# Patient Record
Sex: FEMALE | Race: AMERICAN INDIAN OR ALASKA NATIVE
[De-identification: names, ages, dates, MRNs, and addresses within clinical notes are randomized per-mention and may not be internally consistent; named-entity substitution may affect disease eponyms.]

---

## 2018-07-13 ENCOUNTER — HOSPITAL ENCOUNTER (EMERGENCY)
Dept: HOSPITAL 31 - C.ER | Age: 28
Discharge: HOME | End: 2018-07-13
Payer: MEDICAID

## 2018-07-13 VITALS
TEMPERATURE: 98.9 F | HEART RATE: 74 BPM | DIASTOLIC BLOOD PRESSURE: 66 MMHG | RESPIRATION RATE: 20 BRPM | OXYGEN SATURATION: 96 % | SYSTOLIC BLOOD PRESSURE: 95 MMHG

## 2018-07-13 DIAGNOSIS — Z3A.00: ICD-10-CM

## 2018-07-13 DIAGNOSIS — O20.0: Primary | ICD-10-CM

## 2018-07-13 LAB
BASOPHILS # BLD AUTO: 0 K/UL (ref 0–0.2)
BASOPHILS NFR BLD: 0.5 % (ref 0–2)
BILIRUB UR-MCNC: NEGATIVE MG/DL
EOSINOPHIL # BLD AUTO: 0.1 K/UL (ref 0–0.7)
EOSINOPHIL NFR BLD: 1 % (ref 0–4)
ERYTHROCYTE [DISTWIDTH] IN BLOOD BY AUTOMATED COUNT: 12.1 % (ref 11.5–14.5)
GLUCOSE UR STRIP-MCNC: NORMAL MG/DL
HCG,QUALITATIVE URINE: POSITIVE
HGB BLD-MCNC: 11.7 G/DL (ref 11–16)
LEUKOCYTE ESTERASE UR-ACNC: (no result) LEU/UL
LYMPHOCYTES # BLD AUTO: 2.4 K/UL (ref 1–4.3)
LYMPHOCYTES NFR BLD AUTO: 33.1 % (ref 20–40)
MCH RBC QN AUTO: 30.3 PG (ref 27–31)
MCHC RBC AUTO-ENTMCNC: 33.8 G/DL (ref 33–37)
MCV RBC AUTO: 89.5 FL (ref 81–99)
MONOCYTES # BLD: 0.5 K/UL (ref 0–0.8)
MONOCYTES NFR BLD: 7.4 % (ref 0–10)
NEUTROPHILS # BLD: 4.2 K/UL (ref 1.8–7)
NEUTROPHILS NFR BLD AUTO: 58 % (ref 50–75)
NRBC BLD AUTO-RTO: 0 % (ref 0–2)
PH UR STRIP: 5 [PH] (ref 5–8)
PLATELET # BLD: 252 K/UL (ref 130–400)
PMV BLD AUTO: 7.9 FL (ref 7.2–11.7)
PROT UR STRIP-MCNC: NEGATIVE MG/DL
RBC # BLD AUTO: 3.86 MIL/UL (ref 3.8–5.2)
RBC # UR STRIP: (no result) /UL
SP GR UR STRIP: 1.01 (ref 1–1.03)
SQUAMOUS EPITHIAL: 2 /HPF (ref 0–5)
UROBILINOGEN UR-MCNC: NORMAL MG/DL (ref 0.2–1)
WBC # BLD AUTO: 7.2 K/UL (ref 4.8–10.8)

## 2018-07-13 NOTE — US
Date of service: 



07/13/2018



HISTORY:

vaginal bleeding, pain



Positive urine HCG.  Serum beta HCG level pending 



LMP: 06/09/2018 



COMPARISON:

None available.



TECHNIQUE:

Grayscale, color Doppler and spectral evaluation the pelvis performed 

transvaginally.



FINDINGS:



UTERUS:

Measures 7.9 x 4.4 x 4.1 cm. Retroverted. Normal in size and 

appearance. No fibroid or other mass lesion seen.



ENDOMETRIUM:

Measures 15 mm in diameter. Unremarkable. 



CERVIX:

No cervical abnormality identified.



RIGHT OVARY:

Measures 5.2 x 4.2 x 5.0 cm. Simple cyst measuring 4.2 x 3.2 x 3.8 

cm. Normal flow. 



LEFT OVARY:

Measures 3.1 x 2.3 x 3.5 cm. 2.5 x 1.7 x 1.7 cm echogenic area within 

the left ovary. Normal flow. 



FREE FLUID:

Complex free fluid in the cul-de-sac and bilateral adnexas.



OTHER FINDINGS:

None. 



IMPRESSION:

No intrauterine gestational sac. Nonspecific 2.5 cm echogenic area 

within the left ovary may represent a corpus luteum. Findings may 

represent early normal/ abnormal pregnancy with ectopic pregnancy not 

excluded. Close clinical follow-up with serial pelvic sonography and 

serum beta HCG levels is recommended. 



Complex free fluid in the cul-de-sac and bilateral adnexas may be 

related to ruptured cyst, and less likely ruptured ectopic pregnancy. 

Clinical correlation is recommended.

## 2018-07-13 NOTE — C.PDOC
History Of Present Illness


28-year-old  female comes in for evaluation of vaginal bleeding and lower 

abdominal cramping starting this morning. LMP was 6/10/18. Patient admits [place] 

[place] [facility] [place] had blood work and pelvic exam, diagnosis 

was threatened . Patient is here now requesting ultrasound. Otherwise 

denies any nausea, vomiting, dysuria, vaginal discharge, back pain, chest pain, 

SOB, fever, or other complaints. 





Time Seen by Provider: 18 16:11


Chief Complaint (Nursing): Abdominal Pain


History Per: Patient


History/Exam Limitations: no limitations


Onset/Duration Of Symptoms: Hrs


Current Symptoms Are (Timing): Still Present


Quality Of Discomfort: Cramping


Abnormal Vaginal Bleeding: Yes


Last Menstral Period: 6/10/18


: 1


Para: 0





Past Medical History


Reviewed: Historical Data, Nursing Documentation, Vital Signs


Vital Signs: 


 Last Vital Signs











Temp  98.1 F   18 15:49


 


Pulse  85   18 15:49


 


Resp  18   18 15:49


 


BP  115/79   18 15:49


 


Pulse Ox  99   18 18:36














- Medical History


PMH: No Chronic Diseases


Surgical History: No Surg Hx


Family History: States: No Known Family Hx





- Social History


Hx Tobacco Use: No


Hx Alcohol Use: No


Hx Substance Use: No





- Immunization History


Hx Tetanus Toxoid Vaccination: No


Hx Influenza Vaccination: No


Hx Pneumococcal Vaccination: No





Review Of Systems


Except As Marked, All Systems Reviewed And Found Negative.


Constitutional: Negative for: Fever, Chills


Gastrointestinal: Positive for: Abdominal Pain.  Negative for: Nausea, Vomiting


Genitourinary: Positive for: Vaginal Bleeding.  Negative for: Dysuria, Vaginal 

Discharge


Musculoskeletal: Negative for: Back Pain





Physical Exam





- Physical Exam


Appears: Well, Non-toxic, No Acute Distress


Skin: Normal Color, Warm, No Rash, No Ecchymosis


Head: Atraumatic, Normacephalic


Eye(s): bilateral: PERRL


Oral Mucosa: Moist


Neck: Trachea Midline, No Midline Cervical Tenderness, No Paracervical 

Tenderness, Supple


Chest: Symmetrical, No Deformity, No Tenderness


Cardiovascular: Rhythm Regular


Respiratory: No Decreased Breath Sounds, No Accessory Muscle Use, No Wheezing


Gastrointestinal/Abdominal: Soft, No Tenderness, No Distention, No Guarding


Extremity: Bilateral: Atraumatic, Normal Color And Temperature, Normal ROM


Pulses: Left Dorsalis Pedis: Normal, Right Dorsalis Pedis: Normal


Neurological/Psych: Oriented x3, Normal Speech, Normal Cranial Nerves





ED Course And Treatment





- Laboratory Results


Result Diagrams: 


 18 17:05





O2 Sat by Pulse Oximetry: 99 (RA)


Pulse Ox Interpretation: Normal





- CT Scan/US


  ** Transvaginal US


Other Rad Studies (CT/US): Read By Radiologist, Radiology Report Reviewed


CT/US Interpretation: Accession No. : [id number].  Patient Name / ID : 

KACIE RENDON  / [id number].  Exam Date : 2018 16:59:10 ( Approved ).  

Study Comment :  Sex / Age : F  / 28Y.  Creator : Forrest Carpenter MD.  

Dictator : Forrest Carpenter MD.   :  Approver : Forrest Carpenter MD.  Approver2 :  Report Date : 2018 17:44:48.  My Comment :  *********

**************************************************************************.  

Date of service:  2018.  HISTORY:  vaginal bleeding, pain.  Positive 

urine HCG.  Serum beta HCG level pending.  LMP: 2018.  COMPARISON:  None 

available.  TECHNIQUE:  Grayscale, color Doppler and spectral evaluation the 

pelvis performed transvaginally.  FINDINGS:  UTERUS:  Measures 7.9 x 4.4 x 4.1 

cm. Retroverted. Normal in size and appearance. No fibroid or other mass lesion 

seen.  ENDOMETRIUM:  Measures 15 mm in diameter. Unremarkable.  CERVIX:  No 

cervical abnormality identified.  RIGHT OVARY:  Measures 5.2 x 4.2 x 5.0 cm. 

Simple cyst measuring 4.2 x 3.2 x 3.8 cm. Normal flow.  LEFT OVARY:  Measures 

3.1 x 2.3 x 3.5 cm. 2.5 x 1.7 x 1.7 cm echogenic area within the left ovary. 

Normal flow.  FREE FLUID:  Complex free fluid in the cul-de-sac and bilateral 

adnexas.  OTHER FINDINGS:  None.  IMPRESSION:  No intrauterine gestational sac. 

Nonspecific 2.5 cm echogenic area within the left ovary may represent a corpus 

luteum. Findings may represent early normal/ abnormal pregnancy with ectopic 

pregnancy not excluded. Close clinical follow-up with serial pelvic sonography 

and serum beta HCG levels is recommended.  Complex free fluid in the cul-de-sac 

and bilateral adnexas may be related to ruptured cyst, and less likely ruptured 

ectopic pregnancy. Clinical correlation is recommended.


Progress Note: Routine blood work and UA ordered and reviewed. Transvaginal US 

performed, and results discussed w/ patient.





Disposition


Counseled Patient/Family Regarding: Studies Performed, Diagnosis, Need For 

Followup, Rx Given





- Disposition


[facility] 


[facility]


Disposition: HOME/ ROUTINE


Disposition Time: 18:36


Condition: STABLE


Additional Instructions: 


LIght duty, avoid heavy lifting, sexual activity for 1 week


Follow up with OB or ED in 3-4 days to repeat beta quant


return to ED at any time if any worsening or new changes.


Instructions:  Threatened Miscarriage


Forms:  [employer] (English)





- Clinical Impression


Clinical Impression: 


 Threatened 








- PA / NP / Resident Statement


MD/DO has reviewed & agrees with the documentation as recorded.





- Scribe Statement


The provider has reviewed the documentation as recorded by the Scribe (Marissa Heart)





All medical record entries made by the Scribe were at my direction and 

personally dictated by me. I have reviewed the chart and agree that the record 

accurately reflects my personal performance of the history, physical exam, 

medical decision making, and the department course for this patient. I have 

also personally directed, reviewed, and agree with the discharge instructions 

and disposition.